# Patient Record
Sex: MALE | Race: WHITE | NOT HISPANIC OR LATINO | Employment: OTHER | URBAN - METROPOLITAN AREA
[De-identification: names, ages, dates, MRNs, and addresses within clinical notes are randomized per-mention and may not be internally consistent; named-entity substitution may affect disease eponyms.]

---

## 2023-03-28 ENCOUNTER — NURSING HOME VISIT (OUTPATIENT)
Dept: POST ACUTE CARE | Facility: EXTERNAL LOCATION | Age: 43
End: 2023-03-28
Payer: MEDICARE

## 2023-03-28 DIAGNOSIS — F20.0 PARANOID SCHIZOPHRENIA (MULTI): Primary | ICD-10-CM

## 2023-03-28 DIAGNOSIS — K74.60 CIRRHOSIS OF LIVER WITHOUT ASCITES, UNSPECIFIED HEPATIC CIRRHOSIS TYPE (MULTI): ICD-10-CM

## 2023-03-28 PROCEDURE — 99308 SBSQ NF CARE LOW MDM 20: CPT | Performed by: INTERNAL MEDICINE

## 2023-03-28 NOTE — PROGRESS NOTES
Pt is doing fine , no complaint  GA: Comfortable, no distress  ROS: No SOB  Medications reviewed  Head: Normal  Neck: Soft  Heart: Regular  Lungs: Clear  Abdomen: soft    Impression: clinically doing fine, continue current management    Problem List Items Addressed This Visit          Digestive    Cirrhosis of liver without ascites (CMS/HCC)       Other    Paranoid schizophrenia (CMS/HCC) - Primary

## 2023-03-28 NOTE — LETTER
Patient: Jose Mejía  : 1980    Encounter Date: 2023    Pt is doing fine , no complaint  GA: Comfortable, no distress  ROS: No SOB  Medications reviewed  Head: Normal  Neck: Soft  Heart: Regular  Lungs: Clear  Abdomen: soft    Impression: clinically doing fine, continue current management    Problem List Items Addressed This Visit          Digestive    Cirrhosis of liver without ascites (CMS/HCC)       Other    Paranoid schizophrenia (CMS/HCC) - Primary          Electronically Signed By: Conrado Oh MD   3/28/23  5:42 PM

## 2023-04-25 ENCOUNTER — NURSING HOME VISIT (OUTPATIENT)
Dept: POST ACUTE CARE | Facility: EXTERNAL LOCATION | Age: 43
End: 2023-04-25
Payer: MEDICARE

## 2023-04-25 DIAGNOSIS — F20.0 PARANOID SCHIZOPHRENIA (MULTI): Primary | ICD-10-CM

## 2023-04-25 DIAGNOSIS — K74.60 CIRRHOSIS OF LIVER WITHOUT ASCITES, UNSPECIFIED HEPATIC CIRRHOSIS TYPE (MULTI): ICD-10-CM

## 2023-04-25 PROCEDURE — 99308 SBSQ NF CARE LOW MDM 20: CPT | Performed by: INTERNAL MEDICINE

## 2023-04-25 NOTE — LETTER
Patient: Jose Mejía  : 1980    Encounter Date: 2023    Pt was seen in the NH,Pt is doing fine , no complaint  General appearance: Comfortable, no distress  ROS: No SOB  Medications reviewed  Head: Normal  Neck: Soft  Heart: Regular  Lungs: Clear  Abdomen: soft    Impression: clinically doing fine, continue current management    Problem List Items Addressed This Visit          Digestive    Cirrhosis of liver without ascites (CMS/HCC)       Other    Paranoid schizophrenia (CMS/HCC) - Primary          Electronically Signed By: Conrado Oh MD   23  3:42 PM

## 2023-04-25 NOTE — PROGRESS NOTES
Pt was seen in the NH,Pt is doing fine , no complaint  General appearance: Comfortable, no distress  ROS: No SOB  Medications reviewed  Head: Normal  Neck: Soft  Heart: Regular  Lungs: Clear  Abdomen: soft    Impression: clinically doing fine, continue current management    Problem List Items Addressed This Visit          Digestive    Cirrhosis of liver without ascites (CMS/HCC)       Other    Paranoid schizophrenia (CMS/HCC) - Primary

## 2023-05-30 ENCOUNTER — NURSING HOME VISIT (OUTPATIENT)
Dept: POST ACUTE CARE | Facility: EXTERNAL LOCATION | Age: 43
End: 2023-05-30
Payer: MEDICARE

## 2023-05-30 DIAGNOSIS — F20.0 PARANOID SCHIZOPHRENIA (MULTI): Primary | ICD-10-CM

## 2023-05-30 DIAGNOSIS — K74.60 CIRRHOSIS OF LIVER WITHOUT ASCITES, UNSPECIFIED HEPATIC CIRRHOSIS TYPE (MULTI): ICD-10-CM

## 2023-05-30 PROCEDURE — 99308 SBSQ NF CARE LOW MDM 20: CPT | Performed by: INTERNAL MEDICINE

## 2023-05-30 NOTE — LETTER
Patient: Jose Mejía  : 1980    Encounter Date: 2023    Pt was seen in the NH,Pt is doing fine , no complaint  General appearance: Comfortable, no distress  ROS: No SOB  Medications reviewed  Head: Normal  Neck: Soft  Heart: Regular  Lungs: Clear  Abdomen: soft    Impression: clinically doing fine, continue current management    Problem List Items Addressed This Visit          Digestive    Cirrhosis of liver without ascites (CMS/HCC)       Other    Paranoid schizophrenia (CMS/HCC) - Primary          Electronically Signed By: Conrado Oh MD   23  5:49 PM

## 2023-06-27 ENCOUNTER — NURSING HOME VISIT (OUTPATIENT)
Dept: POST ACUTE CARE | Facility: EXTERNAL LOCATION | Age: 43
End: 2023-06-27
Payer: MEDICARE

## 2023-06-27 DIAGNOSIS — K74.60 CIRRHOSIS OF LIVER WITHOUT ASCITES, UNSPECIFIED HEPATIC CIRRHOSIS TYPE (MULTI): ICD-10-CM

## 2023-06-27 DIAGNOSIS — F20.0 PARANOID SCHIZOPHRENIA (MULTI): Primary | ICD-10-CM

## 2023-06-27 PROCEDURE — 99308 SBSQ NF CARE LOW MDM 20: CPT | Performed by: INTERNAL MEDICINE

## 2023-06-27 NOTE — LETTER
Patient: Jose Mejía  : 1980    Encounter Date: 2023    Pt was seen in the NH,Pt is in his usual state , no complaint  General appearance: Comfortable, no distress  ROS: No SOB  Medications reviewed  Head: Normal  Neck: Soft  Heart: Regular  Lungs: Clear  Abdomen: soft    Impression: clinically doing fine, continue current management    Problem List Items Addressed This Visit       Paranoid schizophrenia (CMS/HCC) - Primary    Cirrhosis of liver without ascites (CMS/HCC)          Electronically Signed By: Conrado Oh MD   23  8:12 PM

## 2023-06-28 NOTE — PROGRESS NOTES
Pt was seen in the NH,Pt is in his usual state , no complaint  General appearance: Comfortable, no distress  ROS: No SOB  Medications reviewed  Head: Normal  Neck: Soft  Heart: Regular  Lungs: Clear  Abdomen: soft    Impression: clinically doing fine, continue current management    Problem List Items Addressed This Visit       Paranoid schizophrenia (CMS/HCC) - Primary    Cirrhosis of liver without ascites (CMS/HCC)

## 2023-07-25 ENCOUNTER — NURSING HOME VISIT (OUTPATIENT)
Dept: POST ACUTE CARE | Facility: EXTERNAL LOCATION | Age: 43
End: 2023-07-25
Payer: MEDICARE

## 2023-07-25 DIAGNOSIS — F20.0 PARANOID SCHIZOPHRENIA (MULTI): Primary | ICD-10-CM

## 2023-07-25 DIAGNOSIS — K74.60 CIRRHOSIS OF LIVER WITHOUT ASCITES, UNSPECIFIED HEPATIC CIRRHOSIS TYPE (MULTI): ICD-10-CM

## 2023-07-25 PROCEDURE — 99308 SBSQ NF CARE LOW MDM 20: CPT | Performed by: INTERNAL MEDICINE

## 2023-07-25 NOTE — LETTER
Patient: Jose Mejía  : 1980    Encounter Date: 2023    Pt was seen in the NH,Pt is in usual state , no complaint  General appearance: Comfortable, no distress  ROS: No SOB  Medications reviewed  Head: Normal  Neck: Soft  Heart: Regular  Lungs: Clear  Abdomen: soft    Impression: clinically doing fine, YEARLY BW, continue current management    Problem List Items Addressed This Visit       Paranoid schizophrenia (CMS/HCC) - Primary    Cirrhosis of liver without ascites (CMS/HCC)          Electronically Signed By: Conrado Oh MD   23  4:48 PM

## 2023-07-25 NOTE — PROGRESS NOTES
Pt was seen in the NH,Pt is in usual state , no complaint  General appearance: Comfortable, no distress  ROS: No SOB  Medications reviewed  Head: Normal  Neck: Soft  Heart: Regular  Lungs: Clear  Abdomen: soft    Impression: clinically doing fine, YEARLY BW, continue current management    Problem List Items Addressed This Visit       Paranoid schizophrenia (CMS/HCC) - Primary    Cirrhosis of liver without ascites (CMS/HCC)

## 2023-08-29 ENCOUNTER — NURSING HOME VISIT (OUTPATIENT)
Dept: POST ACUTE CARE | Facility: EXTERNAL LOCATION | Age: 43
End: 2023-08-29
Payer: MEDICARE

## 2023-08-29 DIAGNOSIS — F20.0 PARANOID SCHIZOPHRENIA (MULTI): Primary | ICD-10-CM

## 2023-08-29 DIAGNOSIS — K74.60 CIRRHOSIS OF LIVER WITHOUT ASCITES, UNSPECIFIED HEPATIC CIRRHOSIS TYPE (MULTI): ICD-10-CM

## 2023-08-29 PROCEDURE — 99308 SBSQ NF CARE LOW MDM 20: CPT | Performed by: INTERNAL MEDICINE

## 2023-08-29 NOTE — PROGRESS NOTES
Pt was seen in the NH.  Pt is in usual state , no complaint  General appearance: Comfortable, no distress  ROS: No SOB  Medications reviewed  Head: Normal  Neck: Soft  Heart: Regular  Lungs: Clear  Abdomen: soft    Impression: clinically doing fine, continue current management    Problem List Items Addressed This Visit       Paranoid schizophrenia (CMS/HCC) - Primary    Cirrhosis of liver without ascites (CMS/HCC)

## 2023-08-29 NOTE — LETTER
Patient: Jose Mejía  : 1980    Encounter Date: 2023    Pt was seen in the NH.  Pt is in usual state , no complaint  General appearance: Comfortable, no distress  ROS: No SOB  Medications reviewed  Head: Normal  Neck: Soft  Heart: Regular  Lungs: Clear  Abdomen: soft    Impression: clinically doing fine, continue current management    Problem List Items Addressed This Visit       Paranoid schizophrenia (CMS/HCC) - Primary    Cirrhosis of liver without ascites (CMS/HCC)          Electronically Signed By: Conrado Oh MD   23  7:35 PM

## 2023-09-26 ENCOUNTER — NURSING HOME VISIT (OUTPATIENT)
Dept: POST ACUTE CARE | Facility: EXTERNAL LOCATION | Age: 43
End: 2023-09-26
Payer: MEDICARE

## 2023-09-26 DIAGNOSIS — K74.60 CIRRHOSIS OF LIVER WITHOUT ASCITES, UNSPECIFIED HEPATIC CIRRHOSIS TYPE (MULTI): ICD-10-CM

## 2023-09-26 DIAGNOSIS — F20.0 PARANOID SCHIZOPHRENIA (MULTI): Primary | ICD-10-CM

## 2023-09-26 PROCEDURE — 99308 SBSQ NF CARE LOW MDM 20: CPT | Performed by: INTERNAL MEDICINE

## 2023-09-26 NOTE — LETTER
Patient: Jose Mejía  : 1980    Encounter Date: 2023    Pt was seen in the NH.  Pt is in usual state , no complaint  General appearance: Comfortable, no distress  ROS: No SOB  Medications reviewed  Head: Normal  Neck: Soft  Heart: Regular  Lungs: Clear  Abdomen: soft    Impression: clinically doing fine, continue current management    Problem List Items Addressed This Visit       Paranoid schizophrenia (CMS/HCC) - Primary    Cirrhosis of liver without ascites (CMS/HCC)          Electronically Signed By: Conrado Oh MD   23  9:54 PM

## 2023-10-03 ENCOUNTER — APPOINTMENT (OUTPATIENT)
Dept: PRIMARY CARE | Facility: CLINIC | Age: 43
End: 2023-10-03
Payer: MEDICARE

## 2023-10-31 ENCOUNTER — NURSING HOME VISIT (OUTPATIENT)
Dept: POST ACUTE CARE | Facility: EXTERNAL LOCATION | Age: 43
End: 2023-10-31
Payer: MEDICARE

## 2023-10-31 DIAGNOSIS — K74.60 CIRRHOSIS OF LIVER WITHOUT ASCITES, UNSPECIFIED HEPATIC CIRRHOSIS TYPE (MULTI): Primary | ICD-10-CM

## 2023-10-31 DIAGNOSIS — F20.0 PARANOID SCHIZOPHRENIA (MULTI): ICD-10-CM

## 2023-10-31 PROCEDURE — 99308 SBSQ NF CARE LOW MDM 20: CPT | Performed by: INTERNAL MEDICINE

## 2023-10-31 NOTE — PROGRESS NOTES
Pt was seen in the NH.  Pt is in usual state , no complaint  General appearance: Comfortable, no distress  ROS: No SOB  Medications reviewed  Head: Normal  Neck: Soft  Heart: Regular  Lungs: Clear  Abdomen: soft    Impression: clinically doing fine, continue current management    Problem List Items Addressed This Visit       Paranoid schizophrenia (CMS/HCC)    Cirrhosis of liver without ascites (CMS/HCC) - Primary

## 2023-10-31 NOTE — LETTER
Patient: Jose Mejía  : 1980    Encounter Date: 10/31/2023    Pt was seen in the NH.  Pt is in usual state , no complaint  General appearance: Comfortable, no distress  ROS: No SOB  Medications reviewed  Head: Normal  Neck: Soft  Heart: Regular  Lungs: Clear  Abdomen: soft    Impression: clinically doing fine, continue current management    Problem List Items Addressed This Visit       Paranoid schizophrenia (CMS/HCC)    Cirrhosis of liver without ascites (CMS/HCC) - Primary          Electronically Signed By: Conrado Oh MD   10/31/23  5:20 PM

## 2023-11-28 ENCOUNTER — NURSING HOME VISIT (OUTPATIENT)
Dept: POST ACUTE CARE | Facility: EXTERNAL LOCATION | Age: 43
End: 2023-11-28
Payer: MEDICARE

## 2023-11-28 DIAGNOSIS — F20.0 PARANOID SCHIZOPHRENIA (MULTI): Primary | ICD-10-CM

## 2023-11-28 DIAGNOSIS — K74.60 CIRRHOSIS OF LIVER WITHOUT ASCITES, UNSPECIFIED HEPATIC CIRRHOSIS TYPE (MULTI): ICD-10-CM

## 2023-11-28 PROCEDURE — 99308 SBSQ NF CARE LOW MDM 20: CPT | Performed by: INTERNAL MEDICINE

## 2023-11-28 NOTE — LETTER
Patient: Jose Mejía  : 1980    Encounter Date: 2023    Pt was seen in the NH.  Pt is in usual state , no complaint  General appearance: Comfortable, no distress  ROS: No SOB  Medications reviewed  Head: Normal  Neck: Soft  Heart: Regular  Lungs: Clear  Abdomen: soft    Impression: clinically doing fine, continue current management    Problem List Items Addressed This Visit       Paranoid schizophrenia (CMS/HCC) - Primary    Cirrhosis of liver without ascites (CMS/HCC)          Electronically Signed By: Conrado Oh MD   23  3:41 PM

## 2023-12-16 ENCOUNTER — NURSING HOME VISIT (OUTPATIENT)
Dept: POST ACUTE CARE | Facility: EXTERNAL LOCATION | Age: 43
End: 2023-12-16
Payer: MEDICARE

## 2023-12-16 VITALS — SYSTOLIC BLOOD PRESSURE: 126 MMHG | DIASTOLIC BLOOD PRESSURE: 74 MMHG | WEIGHT: 142.8 LBS

## 2023-12-16 DIAGNOSIS — Z00.00 HEALTHCARE MAINTENANCE: ICD-10-CM

## 2023-12-16 DIAGNOSIS — K74.60 CIRRHOSIS OF LIVER WITHOUT ASCITES, UNSPECIFIED HEPATIC CIRRHOSIS TYPE (MULTI): ICD-10-CM

## 2023-12-16 DIAGNOSIS — F20.0 PARANOID SCHIZOPHRENIA (MULTI): Primary | ICD-10-CM

## 2023-12-16 PROCEDURE — G0439 PPPS, SUBSEQ VISIT: HCPCS | Performed by: INTERNAL MEDICINE

## 2023-12-16 PROCEDURE — 99308 SBSQ NF CARE LOW MDM 20: CPT | Performed by: INTERNAL MEDICINE

## 2023-12-16 ASSESSMENT — ACTIVITIES OF DAILY LIVING (ADL)
MANAGING_FINANCES: TOTAL CARE
DRESSING: INDEPENDENT
GROCERY_SHOPPING: TOTAL CARE
DOING_HOUSEWORK: TOTAL CARE
BATHING: NEEDS ASSISTANCE
TAKING_MEDICATION: TOTAL CARE

## 2023-12-16 NOTE — LETTER
Patient: Jose Mejía  : 1980    Encounter Date: 2023    Pt was seen in the NH.  Pt is in usual state , no complaint  General appearance: Comfortable, no distress  ROS: No SOB  Medications reviewed  Head: Normal  Neck: Soft  Heart: Regular  Lungs: Clear  Abdomen: soft    Impression: clinically doing fine, continue current management    Problem List Items Addressed This Visit       Paranoid schizophrenia (CMS/HCC) - Primary    Cirrhosis of liver without ascites (CMS/HCC)     Other Visit Diagnoses       Healthcare maintenance                   Electronically Signed By: Conrado Oh MD   23 11:04 PM

## 2023-12-17 NOTE — PROGRESS NOTES
Pt was seen in the NH.  Pt is in usual state , no complaint  General appearance: Comfortable, no distress  ROS: No SOB  Medications reviewed  Head: Normal  Neck: Soft  Heart: Regular  Lungs: Clear  Abdomen: soft    Impression: clinically doing fine, continue current management    Problem List Items Addressed This Visit       Paranoid schizophrenia (CMS/HCC) - Primary    Cirrhosis of liver without ascites (CMS/HCC)     Other Visit Diagnoses       Healthcare maintenance

## 2024-02-28 ENCOUNTER — NURSING HOME VISIT (OUTPATIENT)
Dept: POST ACUTE CARE | Facility: EXTERNAL LOCATION | Age: 44
End: 2024-02-28
Payer: MEDICARE

## 2024-02-28 DIAGNOSIS — K74.60 CIRRHOSIS OF LIVER WITHOUT ASCITES, UNSPECIFIED HEPATIC CIRRHOSIS TYPE (MULTI): ICD-10-CM

## 2024-02-28 DIAGNOSIS — F20.0 PARANOID SCHIZOPHRENIA (MULTI): ICD-10-CM

## 2024-02-28 PROCEDURE — 99308 SBSQ NF CARE LOW MDM 20: CPT | Performed by: INTERNAL MEDICINE

## 2024-02-28 NOTE — PROGRESS NOTES
Pt was seen in the NH.  Pt is in usual state , no complaint  General appearance: Comfortable, no distress  ROS: No SOB  Medications reviewed  Head: Normal  Neck: Soft  Heart: Regular  Lungs: Clear  Abdomen: soft    Impression: clinically doing fine, continue current management    Problem List Items Addressed This Visit       Paranoid schizophrenia (CMS/HCC)    Cirrhosis of liver without ascites (CMS/HCC)

## 2024-02-28 NOTE — LETTER
Patient: Jose Mejía  : 1980    Encounter Date: 2024    Pt was seen in the NH.  Pt is in usual state , no complaint  General appearance: Comfortable, no distress  ROS: No SOB  Medications reviewed  Head: Normal  Neck: Soft  Heart: Regular  Lungs: Clear  Abdomen: soft    Impression: clinically doing fine, continue current management    Problem List Items Addressed This Visit       Paranoid schizophrenia (CMS/HCC)    Cirrhosis of liver without ascites (CMS/HCC)          Electronically Signed By: Conrado Oh MD   24  6:18 PM

## 2024-03-27 ENCOUNTER — NURSING HOME VISIT (OUTPATIENT)
Dept: POST ACUTE CARE | Facility: EXTERNAL LOCATION | Age: 44
End: 2024-03-27
Payer: MEDICARE

## 2024-03-27 DIAGNOSIS — F20.0 PARANOID SCHIZOPHRENIA (MULTI): ICD-10-CM

## 2024-03-27 DIAGNOSIS — K74.60 CIRRHOSIS OF LIVER WITHOUT ASCITES, UNSPECIFIED HEPATIC CIRRHOSIS TYPE (MULTI): Primary | ICD-10-CM

## 2024-03-27 PROCEDURE — 99308 SBSQ NF CARE LOW MDM 20: CPT | Performed by: INTERNAL MEDICINE

## 2024-03-27 NOTE — LETTER
Patient: Jose Mejía  : 1980    Encounter Date: 2024    Pt was seen in the NH.  Pt is in usual state , no complaint  General appearance: Comfortable, no distress  ROS: No SOB  Medications reviewed  Head: Normal  Neck: Soft  Heart: Regular  Lungs: Clear  Abdomen: soft    Impression: clinically doing fine, continue current management    Problem List Items Addressed This Visit       Paranoid schizophrenia (CMS/HCC)    Cirrhosis of liver without ascites (CMS/HCC) - Primary          Electronically Signed By: Conrado Oh MD   3/27/24  3:44 PM

## 2024-04-29 ENCOUNTER — NURSING HOME VISIT (OUTPATIENT)
Dept: POST ACUTE CARE | Facility: EXTERNAL LOCATION | Age: 44
End: 2024-04-29
Payer: MEDICARE

## 2024-04-29 DIAGNOSIS — F20.0 PARANOID SCHIZOPHRENIA (MULTI): Primary | ICD-10-CM

## 2024-04-29 PROCEDURE — 99308 SBSQ NF CARE LOW MDM 20: CPT | Performed by: INTERNAL MEDICINE

## 2024-04-29 NOTE — LETTER
Patient: Jose Mejía  : 1980    Encounter Date: 2024    Pt was seen in the NH.  Pt is in usual state , no complaint  General appearance: Comfortable, no distress  ROS: No SOB  Medications reviewed  Head: Normal  Neck: Soft  Heart: Regular  Lungs: Clear  Abdomen: soft    Plan:   1)clinically doing fine  2) To continue Invega 4.5 mg daily total    Problem List Items Addressed This Visit       Paranoid schizophrenia (Multi) - Primary          Electronically Signed By: Conrado Oh MD   24  8:20 PM

## 2024-04-30 NOTE — PROGRESS NOTES
Pt was seen in the NH.  Pt is in usual state , no complaint  General appearance: Comfortable, no distress  ROS: No SOB  Medications reviewed  Head: Normal  Neck: Soft  Heart: Regular  Lungs: Clear  Abdomen: soft    Plan:   1)clinically doing fine  2) To continue Invega 4.5 mg daily total    Problem List Items Addressed This Visit       Paranoid schizophrenia (Multi) - Primary

## 2024-05-31 ENCOUNTER — NURSING HOME VISIT (OUTPATIENT)
Dept: POST ACUTE CARE | Facility: EXTERNAL LOCATION | Age: 44
End: 2024-05-31
Payer: MEDICARE

## 2024-05-31 DIAGNOSIS — F32.9 MAJOR DEPRESSIVE DISORDER WITH SINGLE EPISODE, REMISSION STATUS UNSPECIFIED: Primary | ICD-10-CM

## 2024-05-31 PROCEDURE — 99308 SBSQ NF CARE LOW MDM 20: CPT | Performed by: INTERNAL MEDICINE

## 2024-05-31 NOTE — LETTER
Patient: Jose Mejía  : 1980    Encounter Date: 2024    Pt was seen in the NH.  Pt is in usual state , no complaint  General appearance: Comfortable, no distress  ROS: No SOB  Medications reviewed  Head: Normal  Neck: Soft  Heart: Regular  Lungs: Clear  Abdomen: soft    Plan:   1)clinically doing fine  2) To continue Prozac 40 mg daily    Problem List Items Addressed This Visit    None  Visit Diagnoses       Major depressive disorder with single episode, remission status unspecified    -  Primary               Electronically Signed By: Conrado Oh MD   24  9:41 PM

## 2024-06-01 NOTE — PROGRESS NOTES
Pt was seen in the NH.  Pt is in usual state , no complaint  General appearance: Comfortable, no distress  ROS: No SOB  Medications reviewed  Head: Normal  Neck: Soft  Heart: Regular  Lungs: Clear  Abdomen: soft    Plan:   1)clinically doing fine  2) To continue Prozac 40 mg daily    Problem List Items Addressed This Visit    None  Visit Diagnoses       Major depressive disorder with single episode, remission status unspecified    -  Primary

## 2024-06-26 ENCOUNTER — NURSING HOME VISIT (OUTPATIENT)
Dept: POST ACUTE CARE | Facility: EXTERNAL LOCATION | Age: 44
End: 2024-06-26
Payer: MEDICARE

## 2024-06-26 DIAGNOSIS — F20.0 PARANOID SCHIZOPHRENIA (MULTI): Primary | ICD-10-CM

## 2024-06-26 PROCEDURE — 99308 SBSQ NF CARE LOW MDM 20: CPT | Performed by: INTERNAL MEDICINE

## 2024-06-26 NOTE — LETTER
Patient: Jose Mejía  : 1980    Encounter Date: 2024    Pt was seen in the NH.  Pt is in usual state , no complaint  General appearance: Comfortable, no distress  ROS: No SOB  Medications reviewed  Head: Normal  Neck: Soft  Heart: Regular  Lungs: Clear  Abdomen: soft    Plan:   1)clinically doing fine  2) To continue haldol 100 mg q 3 weeks    Problem List Items Addressed This Visit       Paranoid schizophrenia (Multi) - Primary          Electronically Signed By: Conrado Oh MD   24  3:21 PM

## 2024-06-26 NOTE — PROGRESS NOTES
Pt was seen in the NH.  Pt is in usual state , no complaint  General appearance: Comfortable, no distress  ROS: No SOB  Medications reviewed  Head: Normal  Neck: Soft  Heart: Regular  Lungs: Clear  Abdomen: soft    Plan:   1)clinically doing fine  2) To continue haldol 100 mg q 3 weeks    Problem List Items Addressed This Visit       Paranoid schizophrenia (Multi) - Primary

## 2024-08-27 ENCOUNTER — NURSING HOME VISIT (OUTPATIENT)
Dept: POST ACUTE CARE | Facility: EXTERNAL LOCATION | Age: 44
End: 2024-08-27
Payer: MEDICARE

## 2024-08-27 DIAGNOSIS — F20.0 PARANOID SCHIZOPHRENIA (MULTI): Primary | ICD-10-CM

## 2024-08-27 PROCEDURE — 99308 SBSQ NF CARE LOW MDM 20: CPT | Performed by: INTERNAL MEDICINE

## 2024-08-27 NOTE — PROGRESS NOTES
Pt was seen in the NH.  Pt is in usual state , no complaint  General appearance: Comfortable, no distress  ROS: No SOB  Medications reviewed  Head: Normal  Neck: Soft  Heart: Regular  Lungs: Clear  Abdomen: soft    Plan:   1)clinically doing fine  2) To continue haldol 100 mg I'm q 3 weeks    Problem List Items Addressed This Visit       Paranoid schizophrenia (Multi) - Primary

## 2024-08-27 NOTE — LETTER
Patient: Jose Mejía  : 1980    Encounter Date: 2024    Pt was seen in the NH.  Pt is in usual state , no complaint  General appearance: Comfortable, no distress  ROS: No SOB  Medications reviewed  Head: Normal  Neck: Soft  Heart: Regular  Lungs: Clear  Abdomen: soft    Plan:   1)clinically doing fine  2) To continue haldol 100 mg I'm q 3 weeks    Problem List Items Addressed This Visit       Paranoid schizophrenia (Multi) - Primary          Electronically Signed By: Conrado Oh MD   24  6:35 PM

## 2024-09-29 ENCOUNTER — NURSING HOME VISIT (OUTPATIENT)
Dept: POST ACUTE CARE | Facility: EXTERNAL LOCATION | Age: 44
End: 2024-09-29
Payer: MEDICARE

## 2024-09-29 DIAGNOSIS — F32.9 MAJOR DEPRESSIVE DISORDER WITH SINGLE EPISODE, REMISSION STATUS UNSPECIFIED: Primary | ICD-10-CM

## 2024-09-29 PROCEDURE — 99308 SBSQ NF CARE LOW MDM 20: CPT | Performed by: INTERNAL MEDICINE

## 2024-09-29 NOTE — PROGRESS NOTES
Pt was seen in the NH.  Pt is in usual state , no complaint  General appearance: Comfortable, no distress  ROS: No SOB  Medications reviewed  Head: Normal  Neck: Soft  Heart: Regular  Lungs: Clear  Abdomen: soft    Plan:   1)clinically doing fine  2) To continue prozac 40 mg daily     Problem List Items Addressed This Visit       Major depressive disorder with single episode - Primary

## 2024-09-29 NOTE — LETTER
Patient: Jose Mejía  : 1980    Encounter Date: 2024    Pt was seen in the NH.  Pt is in usual state , no complaint  General appearance: Comfortable, no distress  ROS: No SOB  Medications reviewed  Head: Normal  Neck: Soft  Heart: Regular  Lungs: Clear  Abdomen: soft    Plan:   1)clinically doing fine  2) To continue prozac 40 mg daily     Problem List Items Addressed This Visit       Major depressive disorder with single episode - Primary          Electronically Signed By: Conrado Oh MD   24  5:44 PM

## 2024-10-20 ENCOUNTER — NURSING HOME VISIT (OUTPATIENT)
Dept: POST ACUTE CARE | Facility: EXTERNAL LOCATION | Age: 44
End: 2024-10-20
Payer: MEDICARE

## 2024-10-20 VITALS — WEIGHT: 135 LBS | SYSTOLIC BLOOD PRESSURE: 132 MMHG | DIASTOLIC BLOOD PRESSURE: 86 MMHG

## 2024-10-20 DIAGNOSIS — Z00.00 HEALTHCARE MAINTENANCE: Primary | ICD-10-CM

## 2024-10-20 ASSESSMENT — ACTIVITIES OF DAILY LIVING (ADL)
GROCERY_SHOPPING: TOTAL CARE
MANAGING_FINANCES: NEEDS ASSISTANCE
DRESSING: INDEPENDENT
TAKING_MEDICATION: TOTAL CARE
BATHING: NEEDS ASSISTANCE
DOING_HOUSEWORK: NEEDS ASSISTANCE

## 2024-10-20 NOTE — LETTER
Patient: Jose Mejía  : 1980    Encounter Date: 10/20/2024    Pt was seen in the NH for wellness exam  Pt is in usual state , no complaint  General appearance: Comfortable, no distress  No screening test indicated    THE FOLLOWING WAS REVIEWED ON THE FACILITY EMR:  PMH, MEDICATIONS, ORDERS, ALLERGY, IMMUNIZATION AND MDS      Problem List Items Addressed This Visit    None  Visit Diagnoses       Healthcare maintenance    -  Primary               Electronically Signed By: Conrado Oh MD   10/20/24  8:10 PM

## 2024-10-31 ENCOUNTER — NURSING HOME VISIT (OUTPATIENT)
Dept: POST ACUTE CARE | Facility: EXTERNAL LOCATION | Age: 44
End: 2024-10-31
Payer: MEDICARE

## 2024-10-31 DIAGNOSIS — K72.10 CHRONIC HEPATIC FAILURE WITHOUT COMA (MULTI): ICD-10-CM

## 2024-10-31 DIAGNOSIS — F32.9 MAJOR DEPRESSIVE DISORDER WITH SINGLE EPISODE, REMISSION STATUS UNSPECIFIED: Primary | ICD-10-CM

## 2024-10-31 PROCEDURE — 99308 SBSQ NF CARE LOW MDM 20: CPT | Performed by: INTERNAL MEDICINE

## 2024-11-26 ENCOUNTER — NURSING HOME VISIT (OUTPATIENT)
Dept: POST ACUTE CARE | Facility: EXTERNAL LOCATION | Age: 44
End: 2024-11-26
Payer: MEDICARE

## 2024-11-26 DIAGNOSIS — F32.9 MAJOR DEPRESSIVE DISORDER WITH SINGLE EPISODE, REMISSION STATUS UNSPECIFIED: Primary | ICD-10-CM

## 2024-11-26 PROCEDURE — 99308 SBSQ NF CARE LOW MDM 20: CPT | Performed by: INTERNAL MEDICINE

## 2024-11-26 NOTE — LETTER
Patient: Jose Mejía  : 1980    Encounter Date: 2024    Pt was seen in the NH.  Pt is in usual state , no complaint  General appearance: Comfortable, no distress  ROS: No SOB  Medications reviewed  Head: Normal  Neck: Soft  Heart: Regular  Lungs: Clear  Abdomen: soft    Plan:   1)clinically doing fine  2) To continue prozac 40 mg daily    Problem List Items Addressed This Visit       Major depressive disorder with single episode - Primary          Electronically Signed By: Conrado Oh MD   24  5:33 PM

## 2024-12-12 ENCOUNTER — NURSING HOME VISIT (OUTPATIENT)
Dept: POST ACUTE CARE | Facility: EXTERNAL LOCATION | Age: 44
End: 2024-12-12
Payer: MEDICARE

## 2024-12-12 DIAGNOSIS — F20.0 PARANOID SCHIZOPHRENIA (MULTI): Primary | ICD-10-CM

## 2024-12-12 PROCEDURE — 99308 SBSQ NF CARE LOW MDM 20: CPT | Performed by: INTERNAL MEDICINE

## 2024-12-12 NOTE — PROGRESS NOTES
Pt was seen in the NH.  Pt is in usual state , no complaint  General appearance: Comfortable, no distress  ROS: No SOB  Medications reviewed  Head: Normal  Neck: Soft  Heart: Regular  Lungs: Clear  Abdomen: soft    Plan:   1)clinically doing fine  2) To continue haldol 100 mg I'm q 21 days    Problem List Items Addressed This Visit       Paranoid schizophrenia (Multi) - Primary

## 2024-12-12 NOTE — LETTER
Patient: Jose Mejía  : 1980    Encounter Date: 2024    Pt was seen in the NH.  Pt is in usual state , no complaint  General appearance: Comfortable, no distress  ROS: No SOB  Medications reviewed  Head: Normal  Neck: Soft  Heart: Regular  Lungs: Clear  Abdomen: soft    Plan:   1)clinically doing fine  2) To continue haldol 100 mg I'm q 21 days    Problem List Items Addressed This Visit       Paranoid schizophrenia (Multi) - Primary          Electronically Signed By: Conrado Oh MD   24  3:47 PM

## 2024-12-18 ENCOUNTER — NURSING HOME VISIT (OUTPATIENT)
Dept: POST ACUTE CARE | Facility: EXTERNAL LOCATION | Age: 44
End: 2024-12-18
Payer: MEDICARE

## 2024-12-18 VITALS — DIASTOLIC BLOOD PRESSURE: 84 MMHG | WEIGHT: 137.2 LBS | SYSTOLIC BLOOD PRESSURE: 132 MMHG

## 2024-12-18 DIAGNOSIS — Z00.00 HEALTHCARE MAINTENANCE: Primary | ICD-10-CM

## 2024-12-18 PROCEDURE — G0439 PPPS, SUBSEQ VISIT: HCPCS | Performed by: INTERNAL MEDICINE

## 2024-12-18 ASSESSMENT — ACTIVITIES OF DAILY LIVING (ADL)
TAKING_MEDICATION: INDEPENDENT
MANAGING_FINANCES: NEEDS ASSISTANCE
DOING_HOUSEWORK: NEEDS ASSISTANCE
GROCERY_SHOPPING: NEEDS ASSISTANCE
BATHING: NEEDS ASSISTANCE
DRESSING: INDEPENDENT

## 2024-12-18 NOTE — LETTER
Patient: Jose Mejía  : 1980    Encounter Date: 2024    Pt was seen in the NH for wellness exam  Pt is in usual state , no complaint  General appearance: Comfortable, no distress  No screening test indicated    THE FOLLOWING WAS REVIEWED ON THE FACILITY EMR:  PMH, MEDICATIONS, ORDERS, ALLERGY, IMMUNIZATION AND MDS      Problem List Items Addressed This Visit    None  Visit Diagnoses       Healthcare maintenance    -  Primary               Electronically Signed By: Conrado Oh MD   24  3:40 PM

## 2025-01-29 ENCOUNTER — NURSING HOME VISIT (OUTPATIENT)
Dept: POST ACUTE CARE | Facility: EXTERNAL LOCATION | Age: 45
End: 2025-01-29
Payer: MEDICARE

## 2025-01-29 DIAGNOSIS — K72.10 CHRONIC HEPATIC FAILURE WITHOUT COMA (MULTI): Primary | ICD-10-CM

## 2025-01-29 DIAGNOSIS — F25.1 SCHIZOAFFECTIVE DISORDER, DEPRESSIVE TYPE (MULTI): ICD-10-CM

## 2025-01-29 PROCEDURE — 99308 SBSQ NF CARE LOW MDM 20: CPT | Performed by: INTERNAL MEDICINE

## 2025-01-29 NOTE — LETTER
Patient: Jose Mjeía  : 1980    Encounter Date: 2025    Pt was seen in the NH.  Pt would like to have new eye glasses  General appearance: Comfortable, no distress  ROS: No SOB  Medications reviewed  Head: Normal  Neck: Soft  Heart: Regular  Lungs: Clear  Abdomen: soft    Plan:   1)clinically doing fine, schizoaffective stable, cirrhosis clinically stable  2) refer to optometrist for eye exam    Problem List Items Addressed This Visit       Chronic hepatic failure without coma (Multi) - Primary     Other Visit Diagnoses       Schizoaffective disorder, depressive type (Multi)                   Electronically Signed By: Conrado Oh MD   25  6:27 PM

## 2025-01-29 NOTE — PROGRESS NOTES
Pt was seen in the NH.  Pt would like to have new eye glasses  General appearance: Comfortable, no distress  ROS: No SOB  Medications reviewed  Head: Normal  Neck: Soft  Heart: Regular  Lungs: Clear  Abdomen: soft    Plan:   1)clinically doing fine, schizoaffective stable, cirrhosis clinically stable  2) refer to optometrist for eye exam    Problem List Items Addressed This Visit       Chronic hepatic failure without coma (Multi) - Primary     Other Visit Diagnoses       Schizoaffective disorder, depressive type (Multi)

## 2025-02-26 ENCOUNTER — NURSING HOME VISIT (OUTPATIENT)
Dept: POST ACUTE CARE | Facility: EXTERNAL LOCATION | Age: 45
End: 2025-02-26
Payer: MEDICARE

## 2025-02-26 DIAGNOSIS — F32.9 MAJOR DEPRESSIVE DISORDER WITH SINGLE EPISODE, REMISSION STATUS UNSPECIFIED: Primary | ICD-10-CM

## 2025-02-26 PROCEDURE — 99308 SBSQ NF CARE LOW MDM 20: CPT | Performed by: INTERNAL MEDICINE

## 2025-02-26 NOTE — LETTER
Patient: Jose Mejía  : 1980    Encounter Date: 2025    Pt was seen in the NH.  Pt is in usual state , no complaint  General appearance: Comfortable, no distress  ROS: No SOB  Medications reviewed  Head: Normal  Neck: Soft  Heart: Regular  Lungs: Clear  Abdomen: soft    Plan:   1)clinically doing fine  2) To continue prozac 40 mg daily    Problem List Items Addressed This Visit       Major depressive disorder with single episode - Primary          Electronically Signed By: Conrado Oh MD   25  2:35 PM

## 2025-03-24 ENCOUNTER — NURSING HOME VISIT (OUTPATIENT)
Dept: POST ACUTE CARE | Facility: EXTERNAL LOCATION | Age: 45
End: 2025-03-24
Payer: MEDICARE

## 2025-03-24 DIAGNOSIS — F20.0 PARANOID SCHIZOPHRENIA (MULTI): Primary | ICD-10-CM

## 2025-03-24 PROCEDURE — 99308 SBSQ NF CARE LOW MDM 20: CPT | Performed by: INTERNAL MEDICINE

## 2025-03-24 NOTE — PROGRESS NOTES
Pt was seen in the NH.  Pt is in usual state , no complaint  General appearance: Comfortable, no distress  ROS: No SOB  Medications reviewed  Head: Normal  Neck: Soft  Heart: Regular  Lungs: Clear  Abdomen: soft    Plan:   1)clinically doing fine  2) To continue Invega 4.5 mg daily    Problem List Items Addressed This Visit       Paranoid schizophrenia (Multi) - Primary

## 2025-03-24 NOTE — LETTER
Patient: Jose Mejía  : 1980    Encounter Date: 2025    Pt was seen in the NH.  Pt is in usual state , no complaint  General appearance: Comfortable, no distress  ROS: No SOB  Medications reviewed  Head: Normal  Neck: Soft  Heart: Regular  Lungs: Clear  Abdomen: soft    Plan:   1)clinically doing fine  2) To continue Invega 4.5 mg daily    Problem List Items Addressed This Visit       Paranoid schizophrenia (Multi) - Primary          Electronically Signed By: Conrado Oh MD   3/24/25  5:00 PM

## 2025-04-30 ENCOUNTER — NURSING HOME VISIT (OUTPATIENT)
Dept: POST ACUTE CARE | Facility: EXTERNAL LOCATION | Age: 45
End: 2025-04-30
Payer: MEDICARE

## 2025-04-30 DIAGNOSIS — F32.9 MAJOR DEPRESSIVE DISORDER WITH SINGLE EPISODE, REMISSION STATUS UNSPECIFIED: Primary | ICD-10-CM

## 2025-04-30 PROCEDURE — 99308 SBSQ NF CARE LOW MDM 20: CPT | Performed by: INTERNAL MEDICINE

## 2025-04-30 NOTE — LETTER
Patient: Jose Mejía  : 1980    Encounter Date: 2025    Pt was seen in the NH.  Pt is in usual state , no complaint  General appearance: Comfortable, no distress  ROS: No SOB  Medications reviewed  Head: Normal  Neck: Soft  Heart: Regular  Lungs: Clear  Abdomen: soft    Plan:   1)clinically doing fine  2) To continue prozac 40 mg daily    Problem List Items Addressed This Visit       Major depressive disorder with single episode - Primary          Electronically Signed By: Conrado Oh MD   25  2:42 PM

## 2025-04-30 NOTE — PROGRESS NOTES
Pt was seen in the NH.  Pt is in usual state , no complaint  General appearance: Comfortable, no distress  ROS: No SOB  Medications reviewed  Head: Normal  Neck: Soft  Heart: Regular  Lungs: Clear  Abdomen: soft    Plan:   1)clinically doing fine  2) To continue prozac 40 mg daily    Problem List Items Addressed This Visit       Major depressive disorder with single episode - Primary         [id number]

## 2025-05-27 ENCOUNTER — NURSING HOME VISIT (OUTPATIENT)
Dept: POST ACUTE CARE | Facility: EXTERNAL LOCATION | Age: 45
End: 2025-05-27
Payer: MEDICARE

## 2025-05-27 DIAGNOSIS — F25.1 SCHIZOAFFECTIVE DISORDER, DEPRESSIVE TYPE (MULTI): Primary | ICD-10-CM

## 2025-05-27 PROCEDURE — 99308 SBSQ NF CARE LOW MDM 20: CPT | Performed by: INTERNAL MEDICINE

## 2025-05-27 NOTE — PROGRESS NOTES
Pt was seen in the NH.  Pt is in usual state , no complaint  General appearance: Comfortable, no distress  ROS: No SOB  Medications reviewed  Head: Normal  Neck: Soft  Heart: Regular  Lungs: Clear  Abdomen: soft    Plan:   1)clinically doing fine  2) To continue Invega 4.5 mg daily    Problem List Items Addressed This Visit    None  Visit Diagnoses         Schizoaffective disorder, depressive type (Multi)    -  Primary

## 2025-06-18 ENCOUNTER — NURSING HOME VISIT (OUTPATIENT)
Dept: POST ACUTE CARE | Facility: EXTERNAL LOCATION | Age: 45
End: 2025-06-18
Payer: MEDICARE

## 2025-06-18 DIAGNOSIS — F32.9 MAJOR DEPRESSIVE DISORDER WITH SINGLE EPISODE, REMISSION STATUS UNSPECIFIED: Primary | ICD-10-CM

## 2025-06-18 PROCEDURE — 99308 SBSQ NF CARE LOW MDM 20: CPT | Performed by: INTERNAL MEDICINE

## 2025-06-18 NOTE — LETTER
Patient: Jose Mejía  : 1980    Encounter Date: 2025    Pt was seen in the NH.  Pt is in usual state , no complaint  General appearance: Comfortable, no distress  ROS: No SOB  Medications reviewed  Head: Normal  Neck: Soft  Heart: Regular  Lungs: Clear  Abdomen: soft    Plan:   1)clinically doing fine  2) To continue prozac 40 mg daily    Problem List Items Addressed This Visit       Major depressive disorder with single episode - Primary          Electronically Signed By: Conrado Oh MD   25  2:26 PM

## 2025-08-27 ENCOUNTER — NURSING HOME VISIT (OUTPATIENT)
Dept: POST ACUTE CARE | Facility: EXTERNAL LOCATION | Age: 45
End: 2025-08-27
Payer: MEDICARE

## 2025-08-27 DIAGNOSIS — F20.0 PARANOID SCHIZOPHRENIA (MULTI): Primary | ICD-10-CM

## 2025-08-27 PROCEDURE — 99308 SBSQ NF CARE LOW MDM 20: CPT | Performed by: INTERNAL MEDICINE
